# Patient Record
Sex: FEMALE | Employment: OTHER | URBAN - METROPOLITAN AREA
[De-identification: names, ages, dates, MRNs, and addresses within clinical notes are randomized per-mention and may not be internally consistent; named-entity substitution may affect disease eponyms.]

---

## 2018-01-04 ENCOUNTER — OFFICE VISIT (OUTPATIENT)
Dept: OBGYN | Facility: CLINIC | Age: 68
End: 2018-01-04
Payer: COMMERCIAL

## 2018-01-04 ENCOUNTER — RADIANT APPOINTMENT (OUTPATIENT)
Dept: MAMMOGRAPHY | Facility: CLINIC | Age: 68
End: 2018-01-04
Payer: COMMERCIAL

## 2018-01-04 DIAGNOSIS — Z87.410 HISTORY OF CERVICAL DYSPLASIA: ICD-10-CM

## 2018-01-04 DIAGNOSIS — F33.41 RECURRENT MAJOR DEPRESSIVE DISORDER, IN PARTIAL REMISSION (H): ICD-10-CM

## 2018-01-04 DIAGNOSIS — Z01.419 ENCOUNTER FOR GYNECOLOGICAL EXAMINATION WITHOUT ABNORMAL FINDING: Primary | ICD-10-CM

## 2018-01-04 DIAGNOSIS — Z12.31 VISIT FOR SCREENING MAMMOGRAM: ICD-10-CM

## 2018-01-04 PROCEDURE — 99213 OFFICE O/P EST LOW 20 MIN: CPT | Performed by: OBSTETRICS & GYNECOLOGY

## 2018-01-04 PROCEDURE — G0145 SCR C/V CYTO,THINLAYER,RESCR: HCPCS | Performed by: OBSTETRICS & GYNECOLOGY

## 2018-01-04 PROCEDURE — 77067 SCR MAMMO BI INCL CAD: CPT | Mod: TC

## 2018-01-04 PROCEDURE — G0476 HPV COMBO ASSAY CA SCREEN: HCPCS | Performed by: OBSTETRICS & GYNECOLOGY

## 2018-01-04 RX ORDER — SODIUM PHOSPHATE,MONO-DIBASIC 19G-7G/118
1 ENEMA (ML) RECTAL 2 TIMES DAILY
COMMUNITY

## 2018-01-04 ASSESSMENT — ANXIETY QUESTIONNAIRES
2. NOT BEING ABLE TO STOP OR CONTROL WORRYING: NOT AT ALL
IF YOU CHECKED OFF ANY PROBLEMS ON THIS QUESTIONNAIRE, HOW DIFFICULT HAVE THESE PROBLEMS MADE IT FOR YOU TO DO YOUR WORK, TAKE CARE OF THINGS AT HOME, OR GET ALONG WITH OTHER PEOPLE: NOT DIFFICULT AT ALL
6. BECOMING EASILY ANNOYED OR IRRITABLE: NOT AT ALL
1. FEELING NERVOUS, ANXIOUS, OR ON EDGE: SEVERAL DAYS
7. FEELING AFRAID AS IF SOMETHING AWFUL MIGHT HAPPEN: NOT AT ALL
5. BEING SO RESTLESS THAT IT IS HARD TO SIT STILL: NOT AT ALL
GAD7 TOTAL SCORE: 1
3. WORRYING TOO MUCH ABOUT DIFFERENT THINGS: NOT AT ALL

## 2018-01-04 ASSESSMENT — PATIENT HEALTH QUESTIONNAIRE - PHQ9
5. POOR APPETITE OR OVEREATING: NOT AT ALL
SUM OF ALL RESPONSES TO PHQ QUESTIONS 1-9: 11

## 2018-01-04 NOTE — LETTER
January 11, 2018    Izabella Acevedo  [address] [facility] [address]    Dear Izabella,  We are happy to inform you that your PAP smear result from 1/4/18 is normal.  We are now able to do a follow up test on PAP smears. The DNA test is for HPV (Human Papilloma Virus). Cervical cancer is closely linked with certain types of HPV. Your result showed no evidence of high risk HPV.  Therefore we recommend you return in 3 years for your next pap smear.  You will still need to return to the clinic every year for an annual exam and other preventive tests.  Please contact the clinic at [phone] with any questions.  Sincerely,    Orlando Escobar MD/alba

## 2018-01-04 NOTE — PROGRESS NOTES
Izabella is a 67 year old  female who presents for annual exam.     Besides routine health maintenance, she has no other health concerns today .    Do you have a Health Care Directive?: No: Advance care planning was reviewed with patient; patient declined at this time.    Pt declined weight and height for this appointment      HPI:  The patient's PCP is Dr. Yunier Faye.  Sees Psychiatry. Changed from Prozac to zoloft.  Hx of ANNA III in . S/P TV[facility] due to it.    GYNECOLOGIC HISTORY:No LMP recorded. Patient is postmenopausal..   reports that she has quit smoking. She does not have any smokeless tobacco history on file.      Patient is not sexually active.  STD testing offered?  Declined  Last PHQ-9 score on record= PHQ-9 SCORE 2018   Total Score 11     Last GAD7 score on record=   CROW-7 SCORE 10/1/2015 10/12/2016 2018   Total Score 2 0 1     Alcohol Score = 1    HEALTH MAINTENANCE:  Cholesterol: (No results found for: CHOL   Last Mammo: one year ago, Result: normal, Next Mammo: today   Pap: NA, hyst  DEXA:  >5 years ago  Colonoscopy:  , Result:  normal, Next Colonoscopy: this year.    Health maintenance updated:  yes    HISTORY:  Obstetric History       T0      L0     SAB1   TAB0   Ectopic0   Multiple0   Live Births0       # Outcome Date GA Lbr Dimitri/2nd Weight Sex Delivery Anes PTL Lv   1 SAB                 Patient Active Problem List   Diagnosis     Back pain with radiation     Hyperlipidemia     HTN (hypertension)     History of cervical dysplasia     Past Surgical History:   Procedure Laterality Date     HYSTERECTOMY TOTAL ABDOMINAL, BILATERAL SALPINGO-OOPHORECTOMY, COMBINED      ANNA III     TUBAL LIGATION        Social History   Substance Use Topics     Smoking status: Former Smoker     Smokeless tobacco: Not on file     Alcohol use 0.0 oz/week     0 Standard drinks or equivalent per week      Problem (# of Occurrences) Relation (Name,Age of Onset)    Colon Cancer  (1) Unspecified    Coronary Artery Disease (2) Father, Mother    DIABETES (1) Father    Hypertension (2) Brother, Sister    OSTEOPOROSIS (1) Sister    Thyroid Disease (1) Mother            Current Outpatient Prescriptions   Medication Sig     Sertraline HCl (ZOLOFT PO) Take 100 mg by mouth daily     glucosamine-chondroitin 500-400 MG CAPS per capsule Take 1 capsule by mouth daily     Omega-3 Fatty Acids (OMEGA-3 FISH OIL PO)      Cholecalciferol (VITAMIN D PO)      amphetamine-dextroamphetamine (ADDERALL) 20 MG tablet Take 20 mg by mouth     cholecalciferol (VITAMIN D3) 1000 UNIT tablet Take 1,000 Units by mouth     ibuprofen (ADVIL,MOTRIN) 200 MG tablet Take 200 mg by mouth     simvastatin (ZOCOR) 40 MG tablet Take 40 mg by mouth     buPROPion (WELLBUTRIN XL) 300 MG 24 hr tablet      aspirin 81 MG tablet Take 81 mg by mouth daily     Nutritional Supplements (BIFIDO-GENIC GROWTH FACTORS PO)      Coenzyme Q10 (CO Q 10 PO)      FLUoxetine (PROZAC) 20 MG capsule Take 3 tabs daily     No current facility-administered medications for this visit.        No Known Allergies    Past medical, surgical, social and family history were reviewed and updated in EPIC.    ROS:   Constitutional: Fatigue and Weight Gain  Skin: New Skin Lesions and Skin Dryness  Musculoskeletal: Joint Pain  Psychiatric: Depression    EXAM:  There were no vitals taken for this visit.   BMI: There is no height or weight on file to calculate BMI.    EXAM:  Constitutional: Appearance: Well nourished, well developed alert, in no acute distress  Neck:  Lymph Nodes:  No lymphadenopathy present    Thyroid:  Gland size normal, nontender, no nodules or masses present  on palpation  Chest:  Respiratory Effort:  Breathing unlabored  Cardiovascular:Heart    Auscultation:  Regular rate, normal rhythm, no murmurs present  Breasts: Inspection of Breasts:  No lymphadenopathy present., Palpation of Breasts and Axillae:  No masses present on palpation, no breast  tenderness., Axillary Lymph Nodes:  No lymphadenopathy present. and No nodularity, asymmetry or nipple discharge bilaterally.  Gastrointestinal:  Abdominal Examination:  Abdomen nontender to palpation, tone normal without     rigidity or guarding, no masses present, umbilicus without lesions    Liver and speen:  No hepatomegaly present, liver nontender to palpation    Hernias:  No hernias present  Lymphatic: Lymph Nodes:  No other lymphadenopathy present  Skin:  General Inspection:  No rashes present, no lesions present, no areas of  discoloration.    Genitalia and Groin:  No rashes present, no lesions present, no areas of  discoloration, no masses present  Neurologic/Psychiatric:    Mental Status:  Oriented X3     Pelvic Exam:  External Genitalia:     Normal appearance for age, no discharge present, no tenderness present, no inflammatory lesions present, color normal  Vagina:     Normal vaginal vault without central or paravaginal defects, no discharge present, no inflammatory lesions present, no masses present  Bladder:     Nontender to palpation  Urethra:   Urethral Body:  Urethra palpation normal, urethra structural support normal   Urethral Meatus:  No erythema or lesions present  Cervix:     Surgically absent  Uterus:     Surgically absent  Adnexa:     Surgically absent  Perineum:     Perineum within normal limits, no evidence of trauma, no rashes or skin lesions present  Anus:     Anus within normal limits, no hemorrhoids present  Inguinal Lymph Nodes:     No lymphadenopathy present      COUNSELING:   Reviewed preventive health counseling, as reflected in patient instructions       Regular exercise    BMI:  There is no height or weight on file to calculate BMI.  Weight management plan: Patient was referred to their PCP to discuss a diet and exercise plan.   reports that she has quit smoking. She does not have any smokeless tobacco history on file.        ASSESSMENT:  67 year old female with satisfactory  annual exam.No diagnosis found.    PLAN:  Check pap of vagina. Not considered low risk until 2020  Has f/u with Psych for depression    Orlando Escobar MD

## 2018-01-04 NOTE — MR AVS SNAPSHOT
"              After Visit Summary   2018    Izabella Acevedo    MRN: [id number]           Patient Information     Date Of Birth          1950        Visit Information        Provider Department      2018 2:00 PM Orlando Escobar MD [facility]        Today's Diagnoses     Encounter for gynecological examination without abnormal finding [Z01.419]    -  1    History of cervical dysplasia        Recurrent major depressive disorder, in partial remission (H)           Follow-ups after your visit        Who to contact     If you have questions or need follow up information about today's clinic visit or your schedule please contact [facility] directly at [phone].  Normal or non-critical lab and imaging results will be communicated to you by MyChart, letter or phone within 4 business days after the clinic has received the results. If you do not hear from us within 7 days, please contact the clinic through MyChart or phone. If you have a critical or abnormal lab result, we will notify you by phone as soon as possible.  Submit refill requests through [employer] or call your pharmacy and they will forward the refill request to us. Please allow 3 business days for your refill to be completed.          Additional Information About Your Visit        MyChart Information     [employer] lets you send messages to your doctor, view your test results, renew your prescriptions, schedule appointments and more. To sign up, go to [email or web address] . Click on \"Log in\" on the left side of the screen, which will take you to the Welcome page. Then click on \"Sign up Now\" on the right side of the page.     You will be asked to enter the access code listed below, as well as some personal information. Please follow the directions to create your username and password.     Your access code is: [email or web address]  Expires: 2018  2:37 PM     Your access code will  in 90 days. If you " need help or a new code, please call your [place] clinic or [phone].        Care EveryWhere ID     This is your Care EveryWhere ID. This could be used by other organizations to access your [place] medical records  [id number]         Blood Pressure from Last 3 Encounters:   10/12/16 122/78   02/12/16 122/76   10/01/15 132/72    Weight from Last 3 Encounters:   10/12/16 172 lb (78 kg)   02/12/16 165 lb (74.8 kg)   10/01/15 172 lb (78 kg)              We Performed the Following     HPV High Risk Types DNA Cervical     Pap imaged thin layer screen with HPV - recommended age 30 - 65        Primary Care Provider Office Phone # Fax #    Justyn Chavez [phone]       [facility] [address] [facility] [address]        Equal Access to Services     GINA CHAPA : Hadii sb villegas hadasho Somarielena, waaxda luqadaha, qaybta kaalmada adenathenyada, jasmyne gomez . So [facility] [phone].    ATENCIÓN: Si habla español, tiene a greenberg disposición servicios gratuitos de asistencia lingüística. Llame al [phone].    We comply with applicable federal civil rights laws and [place] laws. We do not discriminate on the basis of race, color, national origin, age, disability, sex, sexual orientation, or gender identity.            Thank you!     Thank you for choosing [facility]  for your care. Our goal is always to provide you with excellent care. Hearing back from our patients is one way we can continue to improve our services. Please take a few minutes to complete the written survey that you may receive in the mail after your visit with us. Thank you!             Your Updated Medication List - Protect others around you: Learn how to safely use, store and throw away your medicines at www.disposemymeds.org.          This list is accurate as of: 1/4/18  2:37 PM.  Always use your most recent med list.                   Brand Name Dispense  Instructions for use Diagnosis    ADDERALL 20 MG per tablet   Generic drug:  amphetamine-dextroamphetamine      Take 20 mg by mouth        aspirin 81 MG tablet      Take 81 mg by mouth daily        BIFIDO-GENIC GROWTH FACTORS PO           buPROPion 300 MG 24 hr tablet    WELLBUTRIN XL          * VITAMIN D PO           * cholecalciferol 1000 UNIT tablet    vitamin D3     Take 1,000 Units by mouth        CO Q 10 PO           glucosamine-chondroitin 500-400 MG Caps per capsule      Take 1 capsule by mouth daily        ibuprofen 200 MG tablet    ADVIL/MOTRIN     Take 200 mg by mouth        OMEGA-3 FISH OIL PO           PROZAC 20 MG capsule   Generic drug:  FLUoxetine      Take 3 tabs daily        simvastatin 40 MG tablet    ZOCOR     Take 40 mg by mouth        ZOLOFT PO      Take 100 mg by mouth daily        * Notice:  This list has 2 medication(s) that are the same as other medications prescribed for you. Read the directions carefully, and ask your doctor or other care provider to review them with you.

## 2018-01-05 ASSESSMENT — ANXIETY QUESTIONNAIRES: GAD7 TOTAL SCORE: 1

## 2018-01-08 LAB
COPATH REPORT: NORMAL
PAP: NORMAL

## 2018-01-10 LAB
FINAL DIAGNOSIS: NORMAL
HPV HR 12 DNA CVX QL NAA+PROBE: NEGATIVE
HPV16 DNA SPEC QL NAA+PROBE: NEGATIVE
HPV18 DNA SPEC QL NAA+PROBE: NEGATIVE
SPECIMEN DESCRIPTION: NORMAL

## 2019-03-21 ENCOUNTER — ANCILLARY PROCEDURE (OUTPATIENT)
Dept: MAMMOGRAPHY | Facility: CLINIC | Age: 69
End: 2019-03-21
Payer: COMMERCIAL

## 2019-03-21 ENCOUNTER — OFFICE VISIT (OUTPATIENT)
Dept: OBGYN | Facility: CLINIC | Age: 69
End: 2019-03-21
Payer: COMMERCIAL

## 2019-03-21 VITALS
WEIGHT: 174 LBS | BODY MASS INDEX: 32.02 KG/M2 | HEIGHT: 62 IN | SYSTOLIC BLOOD PRESSURE: 140 MMHG | DIASTOLIC BLOOD PRESSURE: 72 MMHG

## 2019-03-21 DIAGNOSIS — Z13.820 SCREENING FOR OSTEOPOROSIS: ICD-10-CM

## 2019-03-21 DIAGNOSIS — Z12.31 VISIT FOR SCREENING MAMMOGRAM: ICD-10-CM

## 2019-03-21 DIAGNOSIS — Z01.419 ENCOUNTER FOR GYNECOLOGICAL EXAMINATION WITHOUT ABNORMAL FINDING: Primary | ICD-10-CM

## 2019-03-21 DIAGNOSIS — Z87.410 HISTORY OF CERVICAL DYSPLASIA: ICD-10-CM

## 2019-03-21 PROCEDURE — 77067 SCR MAMMO BI INCL CAD: CPT | Mod: TC

## 2019-03-21 PROCEDURE — 99397 PER PM REEVAL EST PAT 65+ YR: CPT | Performed by: OBSTETRICS & GYNECOLOGY

## 2019-03-21 RX ORDER — DULOXETIN HYDROCHLORIDE 60 MG/1
60 CAPSULE, DELAYED RELEASE ORAL EVERY MORNING
COMMUNITY
Start: 2018-10-18

## 2019-03-21 ASSESSMENT — ANXIETY QUESTIONNAIRES
3. WORRYING TOO MUCH ABOUT DIFFERENT THINGS: NOT AT ALL
2. NOT BEING ABLE TO STOP OR CONTROL WORRYING: NOT AT ALL
GAD7 TOTAL SCORE: 0
5. BEING SO RESTLESS THAT IT IS HARD TO SIT STILL: NOT AT ALL
7. FEELING AFRAID AS IF SOMETHING AWFUL MIGHT HAPPEN: NOT AT ALL
6. BECOMING EASILY ANNOYED OR IRRITABLE: NOT AT ALL
IF YOU CHECKED OFF ANY PROBLEMS ON THIS QUESTIONNAIRE, HOW DIFFICULT HAVE THESE PROBLEMS MADE IT FOR YOU TO DO YOUR WORK, TAKE CARE OF THINGS AT HOME, OR GET ALONG WITH OTHER PEOPLE: NOT DIFFICULT AT ALL
1. FEELING NERVOUS, ANXIOUS, OR ON EDGE: NOT AT ALL

## 2019-03-21 ASSESSMENT — PATIENT HEALTH QUESTIONNAIRE - PHQ9
5. POOR APPETITE OR OVEREATING: NOT AT ALL
SUM OF ALL RESPONSES TO PHQ QUESTIONS 1-9: 10

## 2019-03-21 ASSESSMENT — MIFFLIN-ST. JEOR: SCORE: 1267.51

## 2019-03-21 NOTE — PROGRESS NOTES
"  Izabella is a 68 year old  female who presents for Medicare Limited exam.     Do you have a Health Care Directive?: {HEALTHCARE DIRECTIVE STATUS:821808}    Fall risk:   {Fall Risk for Medicare Annual Wellness Visit:823450::\"Fall Risk Assessment completed per order.\"}    HPI :  ***    GYNECOLOGIC HISTORY:  No LMP recorded. Patient is postmenopausal..   reports that she has quit smoking. she has never used smokeless tobacco.  {Tobacco Cessation -- Delete if patient is a non-smoker:919087}  STD testing offered?  {GC/CHLAMYDIA:911781}  Last PHQ-9 score on record=   PHQ-9 SCORE 2018   PHQ-9 Total Score 11     Last GAD7 score on record=   CROW-7 SCORE 10/1/2015 10/12/2016 2018   Total Score 2 0 1       HEALTH MAINTENANCE:  Cholesterol: (No results found for: CHOL   Last Mammo: {plc mammo:095708::\"one year ago\"}, Result: {plc normal:765012::\"normal\"}, Next Mammo: {plc next mammo:509710::\"today\"}   Pap: (  Lab Results   Component Value Date    PAP NIL 2018    )  DEXA:  ***  Colonoscopy:  ***, Result:  {plc normal:870437::\"normal\"}, Next Colonoscopy: *** years.    HISTORY:  Obstetric History       T0      L0     SAB1   TAB0   Ectopic0   Multiple0   Live Births0       # Outcome Date GA Lbr Dimitri/2nd Weight Sex Delivery Anes PTL Lv   1 SAB                 Past Medical History:   Diagnosis Date     Anxiety      ANNA III (cervical intraepithelial neoplasia grade III) with severe dysplasia      Depression     Managed by Psych     History of hormone replacement therapy      HTN (hypertension)      Hyperlipidemia     Managed by PMD     Past Surgical History:   Procedure Laterality Date     AS EXCISION,LALA TUMOR,MANDIBLE,EXTRA-ORAL      benign     HYSTERECTOMY TOTAL ABDOMINAL, BILATERAL SALPINGO-OOPHORECTOMY, COMBINED      ANNA III     TUBAL LIGATION       Family History   Problem Relation Age of Onset     Colon Cancer Other      Diabetes Father      Coronary Artery Disease Father      " Hypertension Brother      Hypertension Sister      Coronary Artery Disease Mother      Thyroid Disease Mother      Osteoporosis Sister      Social History     Socioeconomic History     Marital status:      Spouse name: Not on file     Number of children: Not on file     Years of education: Not on file     Highest education level: Not on file   Occupational History     Employer: RETIRED   Social Needs     Financial resource strain: Not on file     Food insecurity:     Worry: Not on file     Inability: Not on file     Transportation needs:     Medical: Not on file     Non-medical: Not on file   Tobacco Use     Smoking status: Former Smoker     Smokeless tobacco: Never Used   Substance and Sexual Activity     Alcohol use: Yes     Alcohol/week: 0.0 oz     Drug use: No     Sexual activity: No     Birth control/protection: Post-menopausal     Comment: postmenopausal   Lifestyle     Physical activity:     Days per week: Not on file     Minutes per session: Not on file     Stress: Not on file   Relationships     Social connections:     Talks on phone: Not on file     Gets together: Not on file     Attends [employer] service: Not on file     Active member of club or organization: Not on file     Attends meetings of clubs or organizations: Not on file     Relationship status: Not on file     Intimate partner violence:     Fear of current or ex partner: Not on file     Emotionally abused: Not on file     Physically abused: Not on file     Forced sexual activity: Not on file   Other Topics Concern     Parent/sibling w/ CABG, MI or angioplasty before 65F 55M? Not Asked   Social History Narrative     Not on file     Current Outpatient Medications   Medication Sig     amphetamine-dextroamphetamine (ADDERALL) 20 MG tablet Take 20 mg by mouth     aspirin 81 MG tablet Take 81 mg by mouth daily     buPROPion (WELLBUTRIN XL) 300 MG 24 hr tablet      Cholecalciferol (VITAMIN D PO)      cholecalciferol (VITAMIN D3) 1000 UNIT  "tablet Take 1,000 Units by mouth     Coenzyme Q10 (CO Q 10 PO)      FLUoxetine (PROZAC) 20 MG capsule Take 3 tabs daily     glucosamine-chondroitin 500-400 MG CAPS per capsule Take 1 capsule by mouth daily     ibuprofen (ADVIL,MOTRIN) 200 MG tablet Take 200 mg by mouth     Nutritional Supplements (BIFIDO-GENIC GROWTH FACTORS PO)      Omega-3 Fatty Acids (OMEGA-3 FISH OIL PO)      Sertraline HCl (ZOLOFT PO) Take 100 mg by mouth daily     simvastatin (ZOCOR) 40 MG tablet Take 40 mg by mouth     No current facility-administered medications for this visit.      No Known Allergies    Past medical, surgical, social and family history were reviewed and updated in EPIC.    EXAM:  There were no vitals taken for this visit.   BMI: There is no height or weight on file to calculate BMI.    Constitutional: Appearance: Well nourished, well developed alert, in no acute distress  Breasts: Inspection of Breasts:  No lymphadenopathy present    Palpation of Breasts and Axillae:  No masses present on palpation, no  breast tenderness    Axillary Lymph Nodes:  No lymphadenopathy present  Neurologic/Psychiatric:    Mental Status:  Oriented X3     {Pelvic Exam:731891}    There is no height or weight on file to calculate BMI.  {Weight Management Plan -- Delete if patient has a normal BMI:700347}   reports that she has quit smoking. she has never used smokeless tobacco.  {Tobacco Cessation -- Delete if patient is a non-smoker:656552}    ASSESSMENT:  68 year old female with satisfactory annual exam.  No diagnosis found.    COUNSELING:   {Female:387648::\"Reviewed preventive health counseling, as reflected in patient instructions\"}    PLAN/PATIENT INSTRUCTIONS:    There are no Patient Instructions on file for this visit.    Orlando Escobar MD          "

## 2019-03-21 NOTE — PROGRESS NOTES
Izabella is a 68 year old  female who presents for annual exam.     Besides routine health maintenance, has been dealing itching above vagina.    Do you have a Health Care Directive?: No: Advance care planning reviewed with patient; information given to patient to review.    Fall risk:   Fall Risk Assessment completed per order.    HPI:    Having some external vulvar itching. More at night.   Started new antiseptic.  Hx ANNA III in   Not planning to repeat colonoscopy.   The patient's PCP is Clement [place]. Lookin gfor one closer to home      GYNECOLOGIC HISTORY:  No LMP recorded. Patient is postmenopausal..   reports that she has quit smoking. she has never used smokeless tobacco.    Last PHQ-9 score on record=   PHQ-9 SCORE 3/21/2019   PHQ-9 Total Score 10     Last GAD7 score on record=   CROW-7 SCORE 10/12/2016 2018 3/21/2019   Total Score 0 1 0     Alcohol Score = 1    HEALTH MAINTENANCE:  Cholesterol: followed by primary care provider  Last Mammo: 18, Result: normal, Next Mammo: today   Pap:   Lab Results   Component Value Date    PAP NIL, Neg-HPV 2018   DEXA:  Unsure, needs to schedule  Colonoscopy:  , Result:  normal, Next Colonoscopy: will do FIT test if necessary  Health maintenance updated:  yes    HISTORY:  Obstetric History       T0      L0     SAB1   TAB0   Ectopic0   Multiple0   Live Births0       # Outcome Date GA Lbr Dimitri/2nd Weight Sex Delivery Anes PTL Lv   1 SAB                 Patient Active Problem List   Diagnosis     Back pain with radiation     Hyperlipidemia     HTN (hypertension)     History of carcinoma in situ of cervix     Past Surgical History:   Procedure Laterality Date     AS EXCISION,LALA TUMOR,MANDIBLE,EXTRA-ORAL      benign     HYSTERECTOMY TOTAL ABDOMINAL, BILATERAL SALPINGO-OOPHORECTOMY, COMBINED      ANNA III     TUBAL LIGATION        Social History     Tobacco Use     Smoking status: Former Smoker     Smokeless  "tobacco: Never Used   Substance Use Topics     Alcohol use: Yes     Alcohol/week: 0.0 oz      Problem (# of Occurrences) Relation (Name,Age of Onset)    Colon Cancer (1) Other    Coronary Artery Disease (2) Father, Mother    Diabetes (1) Father    Hypertension (2) Brother, Sister    Osteoporosis (1) Sister    Thyroid Disease (1) Mother            Current Outpatient Medications   Medication Sig     amphetamine-dextroamphetamine (ADDERALL) 20 MG tablet Take 20 mg by mouth     aspirin 81 MG tablet Take 81 mg by mouth daily     buPROPion (WELLBUTRIN XL) 300 MG 24 hr tablet      CALCIUM PO      Coenzyme Q10 (CO Q 10 PO)      DULoxetine (CYMBALTA) 60 MG capsule Take 90mg daily     glucosamine-chondroitin 500-400 MG CAPS per capsule Take 1 capsule by mouth daily     ibuprofen (ADVIL,MOTRIN) 200 MG tablet Take 200 mg by mouth     Multiple Minerals-Vitamins (BONE DENSITY BUILDER PO)      Nutritional Supplements (BIFIDO-GENIC GROWTH FACTORS PO)      Omega-3 Fatty Acids (OMEGA-3 FISH OIL PO)      simvastatin (ZOCOR) 40 MG tablet Take 40 mg by mouth     cholecalciferol (VITAMIN D3) 1000 UNIT tablet Take 2,000 Units by mouth      No current facility-administered medications for this visit.        No Known Allergies    Past medical, surgical, social and family history were reviewed and updated in EPIC.    ROS:   12 point review of systems negative other than symptoms noted below.  Constitutional: Fatigue  Eyes: Vision Loss  Respiratory: Shortness of Breath  Genitourinary: Vaginal Itching  Musculoskeletal: Joint Pain  Psychiatric: Depression    EXAM:  /72   Ht 1.575 m (5' 2\")   Wt 78.9 kg (174 lb)   BMI 31.83 kg/m     BMI: Body mass index is 31.83 kg/m .    EXAM:  Constitutional: Appearance: Well nourished, well developed alert, in no acute distress  Neck:  Lymph Nodes:  No lymphadenopathy present    Thyroid:  Gland size normal, nontender, no nodules or masses present  on palpation  Chest:  Respiratory Effort:  Breathing " unlabored  Cardiovascular:Heart    Auscultation:  Regular rate, normal rhythm, no murmurs present  Breasts: Inspection of Breasts:  No lymphadenopathy present., Palpation of Breasts and Axillae:  No masses present on palpation, no breast tenderness., Axillary Lymph Nodes:  No lymphadenopathy present. and No nodularity, asymmetry or nipple discharge bilaterally.  Gastrointestinal:  Abdominal Examination:  Abdomen nontender to palpation, tone normal without     rigidity or guarding, no masses present, umbilicus without lesions    Liver and speen:  No hepatomegaly present, liver nontender to palpation    Hernias:  No hernias present  Lymphatic: Lymph Nodes:  No other lymphadenopathy present  Skin:  General Inspection:  No rashes present, no lesions present, no areas of  discoloration.    Genitalia and Groin:  No rashes present, no lesions present, no areas of  discoloration, no masses present  Neurologic/Psychiatric:    Mental Status:  Oriented X3     Pelvic Exam:  External Genitalia:     Normal appearance for age, no discharge present, no tenderness present, no inflammatory lesions present, color normal  Vagina:     Normal vaginal vault without central or paravaginal defects, no discharge present, no inflammatory lesions present, no masses present  Bladder:     Nontender to palpation  Urethra:   Urethral Body:  Urethra palpation normal, urethra structural support normal   Urethral Meatus:  No erythema or lesions present  Cervix:     Surgically absent  Uterus:     Surgically absent  Adnexa:     Surgically absent  Perineum:     Perineum within normal limits, no evidence of trauma, no rashes or skin lesions present  Anus:     Anus within normal limits, no hemorrhoids present  Inguinal Lymph Nodes:     No lymphadenopathy present    COUNSELING:   Reviewed preventive health counseling, as reflected in patient instructions       Regular exercise    BMI:  Body mass index is 31.83 kg/m .  Weight management plan: Patient was  referred to their PCP to discuss a diet and exercise plan.   reports that she has quit smoking. she has never used smokeless tobacco.      ASSESSMENT:  68 year old female with satisfactory annual exam.    ICD-10-CM    1. Encounter for gynecological examination without abnormal finding Z01.419        PLAN:  Continue pap next year and 2022. Then stop them  RTC for DEXA.  Pt to inquire about cologard coverage.  Gave cards for new MDs    Orlando Escobar MD

## 2019-03-22 ASSESSMENT — ANXIETY QUESTIONNAIRES: GAD7 TOTAL SCORE: 0

## 2020-09-30 ENCOUNTER — HOSPITAL ENCOUNTER (OUTPATIENT)
Facility: CLINIC | Age: 70
End: 2020-09-30
Attending: ORTHOPAEDIC SURGERY | Admitting: ORTHOPAEDIC SURGERY
Payer: COMMERCIAL

## 2020-09-30 DIAGNOSIS — Z11.59 ENCOUNTER FOR SCREENING FOR OTHER VIRAL DISEASES: Primary | ICD-10-CM

## 2020-10-22 RX ORDER — CEFAZOLIN SODIUM 1 G/3ML
1 INJECTION, POWDER, FOR SOLUTION INTRAMUSCULAR; INTRAVENOUS SEE ADMIN INSTRUCTIONS
Status: CANCELLED | OUTPATIENT
Start: 2020-10-22

## 2020-10-22 RX ORDER — TRANEXAMIC ACID 650 MG/1
1950 TABLET ORAL ONCE
Status: CANCELLED | OUTPATIENT
Start: 2020-10-22 | End: 2020-10-22

## 2020-10-22 RX ORDER — CEFAZOLIN SODIUM 2 G/100ML
2 INJECTION, SOLUTION INTRAVENOUS
Status: CANCELLED | OUTPATIENT
Start: 2020-10-22

## 2020-11-13 DIAGNOSIS — Z11.59 ENCOUNTER FOR SCREENING FOR OTHER VIRAL DISEASES: ICD-10-CM

## 2020-11-13 PROCEDURE — U0003 INFECTIOUS AGENT DETECTION BY NUCLEIC ACID (DNA OR RNA); SEVERE ACUTE RESPIRATORY SYNDROME CORONAVIRUS 2 (SARS-COV-2) (CORONAVIRUS DISEASE [COVID-19]), AMPLIFIED PROBE TECHNIQUE, MAKING USE OF HIGH THROUGHPUT TECHNOLOGIES AS DESCRIBED BY CMS-2020-01-R: HCPCS | Performed by: ORTHOPAEDIC SURGERY

## 2020-11-14 LAB
SARS-COV-2 RNA SPEC QL NAA+PROBE: NOT DETECTED
SPECIMEN SOURCE: NORMAL

## 2020-11-16 ENCOUNTER — HOSPITAL ENCOUNTER (OUTPATIENT)
Facility: CLINIC | Age: 70
End: 2020-11-16
Attending: ORTHOPAEDIC SURGERY | Admitting: ORTHOPAEDIC SURGERY
Payer: COMMERCIAL

## 2020-11-16 RX ORDER — AMPICILLIN TRIHYDRATE 250 MG
200 CAPSULE ORAL EVERY EVENING
COMMUNITY
End: 2020-11-16 | Stop reason: HOSPADM

## 2020-11-16 RX ORDER — DULOXETIN HYDROCHLORIDE 30 MG/1
30 CAPSULE, DELAYED RELEASE ORAL EVERY MORNING
COMMUNITY
End: 2020-11-16 | Stop reason: HOSPADM

## 2020-11-16 RX ORDER — MULTIVIT-MIN/IRON/FOLIC ACID/K 18-600-40
500 CAPSULE ORAL EVERY MORNING
COMMUNITY
End: 2020-11-16 | Stop reason: HOSPADM

## 2020-11-16 RX ORDER — TURMERIC 400 MG
400 CAPSULE ORAL EVERY EVENING
COMMUNITY
End: 2020-11-16 | Stop reason: HOSPADM

## 2020-11-16 RX ORDER — CLOBETASOL PROPIONATE 0.5 MG/G
CREAM TOPICAL 2 TIMES DAILY PRN
COMMUNITY
End: 2020-11-16 | Stop reason: HOSPADM

## 2020-11-16 RX ORDER — DEXTROAMPHETAMINE SACCHARATE, AMPHETAMINE ASPARTATE, DEXTROAMPHETAMINE SULFATE AND AMPHETAMINE SULFATE 5; 5; 5; 5 MG/1; MG/1; MG/1; MG/1
30 TABLET ORAL EVERY MORNING
COMMUNITY
End: 2020-11-16 | Stop reason: HOSPADM

## 2020-11-16 NOTE — PROGRESS NOTES
PTA medications updated by Medication Scribe prior to surgery via phone call with patient      -LAST DOSES ENTERED BY NURSE-    Comments:    Medication history sources: Patient, Surescripts, H&P and Patient's home med list  Medication history source reliability: Good  Adherence assessment: N/A Not Observed    Significant changes made to the medication list:  Patient taking meds differently than prescribed; See PTA entries for: Adderall     Patient reports no longer taking the following meds (med scribe removed from PTA med list): Mult.-Vit.-Bone Density Builder      Additional medication history information:   None        Prior to Admission medications    Medication Sig Last Dose Taking? Auth Provider   amphetamine-dextroamphetamine (ADDERALL) 20 MG tablet Take 30 mg by mouth every morning (1.5 X 20 mg)  at AM Yes Reported, Patient   Ascorbic Acid (VITAMIN C) 500 MG CAPS Take 500 mg by mouth every morning  at AM Yes Reported, Patient   aspirin (ASA) 81 MG EC tablet Take 81 mg by mouth every evening   at PM Yes Reported, Patient   B Complex-C (VITAMIN B COMPLEX W/VITAMIN C) TABS tablet Take 1 tablet by mouth daily (NatureMade)  at AM Yes Reported, Patient   buPROPion (WELLBUTRIN XL) 300 MG 24 hr tablet Take 300 mg by mouth every morning   at AM Yes Reported, Patient   calcium-magnesium-vitamin D 500-250-125 MG-MG-UNIT TABS Take 1 tablet by mouth 2 times daily  at AM Yes Reported, Patient   Cholecalciferol (VITAMIN D3) 1.25 MG (18103 UT) TABS Take 5,000 Units by mouth every morning   at AM Yes Reported, Patient   clobetasol (TEMOVATE) 0.05 % external cream Apply topically 2 times daily as needed  at PRN Yes Reported, Patient   Coenzyme Q10 (COQ10) 200 MG CAPS Take 200 mg by mouth every evening  at PM Yes Reported, Patient   DULoxetine (CYMBALTA) 30 MG capsule Take 30 mg by mouth every morning (in addition to 60 mg morning dose to = 90 mg dose daily)  at AM Yes Reported, Patient   DULoxetine (CYMBALTA) 60 MG capsule  Take 60 mg by mouth every morning (in addition to 30 mg dose to = 90 mg dose daily)  at AM Yes Reported, Patient   fish oil-omega-3 fatty acids (OMEGA-3 FISH OIL) 1000 MG capsule Take 1 g by mouth every evening   at PM Yes Reported, Patient   glucosamine-chondroitin 500-400 MG CAPS per capsule Take 1 capsule by mouth 2 times daily   at AM Yes Reported, Patient   ibuprofen (ADVIL,MOTRIN) 200 MG tablet Take 400-800 mg by mouth every 8 hours as needed   at PRN Yes Reported, Patient   Probiotic Product (PROBIOTIC-10 PO) Take 2 capsules by mouth every morning (Bifido Balance by [employer])  at AM Yes Reported, Patient   Selenium 100 MCG CAPS Take 100 mcg by mouth every evening  at PM Yes Reported, Patient   simvastatin (ZOCOR) 40 MG tablet Take 40 mg by mouth every evening   at PM Yes Reported, Patient   Turmeric 400 MG CAPS Take 400 mg by mouth every evening  at PM Yes Reported, Patient   VITAMIN E COMPLEX PO Take 1 capsule by mouth every morning (Cloudy Days brand)  at AM Yes Reported, Patient

## 2020-11-21 DIAGNOSIS — Z11.59 ENCOUNTER FOR SCREENING FOR OTHER VIRAL DISEASES: Primary | ICD-10-CM

## 2020-12-21 ENCOUNTER — HOSPITAL ENCOUNTER (OUTPATIENT)
Facility: CLINIC | Age: 70
End: 2020-12-21
Attending: ORTHOPAEDIC SURGERY | Admitting: ORTHOPAEDIC SURGERY
Payer: COMMERCIAL

## 2021-01-15 ENCOUNTER — HEALTH MAINTENANCE LETTER (OUTPATIENT)
Age: 71
End: 2021-01-15

## 2021-03-26 ENCOUNTER — MEDICAL CORRESPONDENCE (OUTPATIENT)
Dept: HEALTH INFORMATION MANAGEMENT | Facility: CLINIC | Age: 71
End: 2021-03-26

## 2021-04-07 RX ORDER — CEFAZOLIN SODIUM 2 G/100ML
2 INJECTION, SOLUTION INTRAVENOUS
Status: CANCELLED | OUTPATIENT
Start: 2021-04-07

## 2021-04-07 RX ORDER — TRANEXAMIC ACID 650 MG/1
1950 TABLET ORAL ONCE
Status: CANCELLED | OUTPATIENT
Start: 2021-04-07 | End: 2021-04-07

## 2021-04-07 RX ORDER — CEFAZOLIN SODIUM 2 G/100ML
2 INJECTION, SOLUTION INTRAVENOUS SEE ADMIN INSTRUCTIONS
Status: CANCELLED | OUTPATIENT
Start: 2021-04-07

## 2021-04-20 ENCOUNTER — TRANSFERRED RECORDS (OUTPATIENT)
Dept: SURGERY | Facility: CLINIC | Age: 71
End: 2021-04-20

## 2021-10-24 ENCOUNTER — HEALTH MAINTENANCE LETTER (OUTPATIENT)
Age: 71
End: 2021-10-24

## 2021-12-23 ENCOUNTER — ANCILLARY PROCEDURE (OUTPATIENT)
Dept: MAMMOGRAPHY | Facility: CLINIC | Age: 71
End: 2021-12-23
Payer: COMMERCIAL

## 2021-12-23 DIAGNOSIS — Z12.31 VISIT FOR SCREENING MAMMOGRAM: ICD-10-CM

## 2021-12-23 PROCEDURE — 77067 SCR MAMMO BI INCL CAD: CPT | Mod: TC | Performed by: RADIOLOGY

## 2022-02-13 ENCOUNTER — HEALTH MAINTENANCE LETTER (OUTPATIENT)
Age: 72
End: 2022-02-13

## 2022-10-15 ENCOUNTER — HEALTH MAINTENANCE LETTER (OUTPATIENT)
Age: 72
End: 2022-10-15

## 2023-03-26 ENCOUNTER — HEALTH MAINTENANCE LETTER (OUTPATIENT)
Age: 73
End: 2023-03-26

## 2023-10-29 ENCOUNTER — HEALTH MAINTENANCE LETTER (OUTPATIENT)
Age: 73
End: 2023-10-29

## 2024-05-26 ENCOUNTER — HEALTH MAINTENANCE LETTER (OUTPATIENT)
Age: 74
End: 2024-05-26

## 2024-12-21 ENCOUNTER — HEALTH MAINTENANCE LETTER (OUTPATIENT)
Age: 74
End: 2024-12-21

## 2025-04-25 ENCOUNTER — HOSPITAL ENCOUNTER (EMERGENCY)
Facility: CLINIC | Age: 75
Discharge: HOME | End: 2025-04-25
Attending: EMERGENCY MEDICINE | Admitting: EMERGENCY MEDICINE
Payer: COMMERCIAL

## 2025-04-25 VITALS
OXYGEN SATURATION: 98 % | TEMPERATURE: 98.3 F | WEIGHT: 170 LBS | HEART RATE: 79 BPM | BODY MASS INDEX: 31.28 KG/M2 | SYSTOLIC BLOOD PRESSURE: 127 MMHG | RESPIRATION RATE: 16 BRPM | DIASTOLIC BLOOD PRESSURE: 80 MMHG | HEIGHT: 62 IN

## 2025-04-25 DIAGNOSIS — Z78.9 TAKES DIETARY SUPPLEMENTS: ICD-10-CM

## 2025-04-25 LAB
ALBUMIN SERPL BCG-MCNC: 4 G/DL (ref 3.5–5.2)
ALP SERPL-CCNC: 116 U/L (ref 40–150)
ALT SERPL W P-5'-P-CCNC: 20 U/L (ref 0–50)
ANION GAP SERPL CALCULATED.3IONS-SCNC: 11 MMOL/L (ref 7–15)
AST SERPL W P-5'-P-CCNC: 21 U/L (ref 0–45)
BASE EXCESS BLDV CALC-SCNC: 3.9 MMOL/L (ref -3–3)
BASOPHILS # BLD AUTO: 0.1 10E3/UL (ref 0–0.2)
BASOPHILS NFR BLD AUTO: 1 %
BILIRUB SERPL-MCNC: 0.8 MG/DL
BUN SERPL-MCNC: 16.3 MG/DL (ref 8–23)
CALCIUM SERPL-MCNC: 9.2 MG/DL (ref 8.8–10.4)
CHLORIDE SERPL-SCNC: 103 MMOL/L (ref 98–107)
CREAT SERPL-MCNC: 1.15 MG/DL (ref 0.51–0.95)
EGFRCR SERPLBLD CKD-EPI 2021: 49 ML/MIN/1.73M2
EOSINOPHIL # BLD AUTO: 0.2 10E3/UL (ref 0–0.7)
EOSINOPHIL NFR BLD AUTO: 2 %
ERYTHROCYTE [DISTWIDTH] IN BLOOD BY AUTOMATED COUNT: 13.3 % (ref 10–15)
GLUCOSE SERPL-MCNC: 80 MG/DL (ref 70–99)
HCO3 BLDV-SCNC: 29 MMOL/L (ref 21–28)
HCO3 SERPL-SCNC: 26 MMOL/L (ref 22–29)
HCT VFR BLD AUTO: 40.7 % (ref 35–47)
HGB BLD-MCNC: 13.8 G/DL (ref 11.7–15.7)
IMM GRANULOCYTES # BLD: 0 10E3/UL
IMM GRANULOCYTES NFR BLD: 0 %
LYMPHOCYTES # BLD AUTO: 2.2 10E3/UL (ref 0.8–5.3)
LYMPHOCYTES NFR BLD AUTO: 23 %
MCH RBC QN AUTO: 30.1 PG (ref 26.5–33)
MCHC RBC AUTO-ENTMCNC: 33.9 G/DL (ref 31.5–36.5)
MCV RBC AUTO: 89 FL (ref 78–100)
MONOCYTES # BLD AUTO: 0.5 10E3/UL (ref 0–1.3)
MONOCYTES NFR BLD AUTO: 5 %
NEUTROPHILS # BLD AUTO: 6.7 10E3/UL (ref 1.6–8.3)
NEUTROPHILS NFR BLD AUTO: 69 %
NRBC # BLD AUTO: 0 10E3/UL
NRBC BLD AUTO-RTO: 0 /100
O2/TOTAL GAS SETTING VFR VENT: 21 %
OXYHGB MFR BLDV: 46 % (ref 70–75)
PCO2 BLDV: 47 MM HG (ref 40–50)
PH BLDV: 7.41 [PH] (ref 7.32–7.43)
PLATELET # BLD AUTO: 301 10E3/UL (ref 150–450)
PO2 BLDV: 26 MM HG (ref 25–47)
POTASSIUM SERPL-SCNC: 4.2 MMOL/L (ref 3.4–5.3)
PROT SERPL-MCNC: 7 G/DL (ref 6.4–8.3)
RBC # BLD AUTO: 4.58 10E6/UL (ref 3.8–5.2)
SAO2 % BLDV: 46.9 % (ref 70–75)
SODIUM SERPL-SCNC: 140 MMOL/L (ref 135–145)
TROPONIN T SERPL HS-MCNC: 8 NG/L
WBC # BLD AUTO: 9.7 10E3/UL (ref 4–11)

## 2025-04-25 PROCEDURE — 80053 COMPREHEN METABOLIC PANEL: CPT | Performed by: EMERGENCY MEDICINE

## 2025-04-25 PROCEDURE — 99284 EMERGENCY DEPT VISIT MOD MDM: CPT | Performed by: EMERGENCY MEDICINE

## 2025-04-25 PROCEDURE — 93005 ELECTROCARDIOGRAM TRACING: CPT | Performed by: EMERGENCY MEDICINE

## 2025-04-25 PROCEDURE — 99283 EMERGENCY DEPT VISIT LOW MDM: CPT | Performed by: EMERGENCY MEDICINE

## 2025-04-25 PROCEDURE — 85025 COMPLETE CBC W/AUTO DIFF WBC: CPT | Performed by: EMERGENCY MEDICINE

## 2025-04-25 PROCEDURE — 36415 COLL VENOUS BLD VENIPUNCTURE: CPT | Performed by: EMERGENCY MEDICINE

## 2025-04-25 PROCEDURE — 84484 ASSAY OF TROPONIN QUANT: CPT | Performed by: EMERGENCY MEDICINE

## 2025-04-25 PROCEDURE — 93010 ELECTROCARDIOGRAM REPORT: CPT | Performed by: EMERGENCY MEDICINE

## 2025-04-25 PROCEDURE — 82805 BLOOD GASES W/O2 SATURATION: CPT | Performed by: EMERGENCY MEDICINE

## 2025-04-25 ASSESSMENT — ACTIVITIES OF DAILY LIVING (ADL)
ADLS_ACUITY_SCORE: 41

## 2025-04-25 ASSESSMENT — COLUMBIA-SUICIDE SEVERITY RATING SCALE - C-SSRS
1. IN THE PAST MONTH, HAVE YOU WISHED YOU WERE DEAD OR WISHED YOU COULD GO TO SLEEP AND NOT WAKE UP?: NO
6. HAVE YOU EVER DONE ANYTHING, STARTED TO DO ANYTHING, OR PREPARED TO DO ANYTHING TO END YOUR LIFE?: NO
2. HAVE YOU ACTUALLY HAD ANY THOUGHTS OF KILLING YOURSELF IN THE PAST MONTH?: NO

## 2025-04-25 NOTE — ED TRIAGE NOTES
Patient started taking Bitter Apricot Kernels to help prevent cancer which she bought online, patient wa sonly suppose to take 1/day but patient took 8-10 kernels.      Triage Assessment (Adult)       Row Name 04/25/25 7530          Triage Assessment    Airway WDL WDL        Respiratory WDL    Respiratory WDL WDL        Skin Circulation/Temperature WDL    Skin Circulation/Temperature WDL WDL        Cardiac WDL    Cardiac WDL WDL        Peripheral/Neurovascular WDL    Peripheral Neurovascular WDL WDL        Cognitive/Neuro/Behavioral WDL    Cognitive/Neuro/Behavioral WDL WDL

## 2025-04-25 NOTE — ED PROVIDER NOTES
ED Provider Note  [facility] EMERGENCY DEPARTMENT ([facility])    4/25/25       ED PROVIDER NOTE     History     Chief Complaint   Patient presents with    Dizziness     Patient started taking Bitter Apricot Kernels and thinks she took too much and its making her lightheaded and dizzy     HPI  Izabella Acevedo is a 74 year old female with a notable history of hyperlipidemia, hypertension, and cervical cancer who presents to the ED for evaluation of bitter apricot kernel ingestion.  The patient states that she heard a podcast that recommended bitter apricot kernel as a cancer preventative.  She states that she obtained a product from [employer]: Pure wid Cure bitter apricot kernels.  Patient states that she put 8-10 salad and ate a salad.  After she completed the salad, she looked at the package and only recommended 2 to 3 kernels /day.  She subsequently performed an Internet search and became concerned for a cyanide toxicity.  She presents to the emergency department now approxi-1 hour after ingesting the bitter apricot kernels.  She denies any symptoms.    Past Medical History  Past Medical History:   Diagnosis Date    ADD (attention deficit disorder)     Anxiety     ANNA III (cervical intraepithelial neoplasia grade III) with severe dysplasia     Depression     Managed by Psych    History of hormone replacement therapy     HTN (hypertension)     Hyperlipidemia     Managed by PMD    Sleep apnea      Past Surgical History:   Procedure Laterality Date    AS EXCISION,LALA TUMOR,MANDIBLE,EXTRA-ORAL      benign    EYE SURGERY      cataract    HYSTERECTOMY TOTAL ABDOMINAL, BILATERAL SALPINGO-OOPHORECTOMY, COMBINED  2002    ANNA III    TUBAL LIGATION  1984     aspirin (ASA) 81 MG EC tablet  buPROPion (WELLBUTRIN XL) 300 MG 24 hr tablet  Cholecalciferol (VITAMIN D3) 1.25 MG (68588 UT) TABS  DULoxetine (CYMBALTA) 60 MG capsule  fish oil-omega-3 fatty acids (OMEGA-3 FISH OIL) 1000 MG  "capsule  glucosamine-chondroitin 500-400 MG CAPS per capsule  ibuprofen (ADVIL,MOTRIN) 200 MG tablet  simvastatin (ZOCOR) 40 MG tablet      No Known Allergies  Family History  Family History   Problem Relation Age of Onset    Colon Cancer Other     Diabetes Father     Coronary Artery Disease Father     Hypertension Brother     Hypertension Sister     Coronary Artery Disease Mother     Thyroid Disease Mother     Osteoporosis Sister      Social History   Social History     Tobacco Use    Smoking status: Former     Current packs/day: 0.00     Types: Cigarettes     Quit date: 2020     Years since quittin.7    Smokeless tobacco: Never   Substance Use Topics    Alcohol use: Yes     Alcohol/week: 0.0 standard drinks of alcohol     Comment: OCASIONALY    Drug use: No      A medically appropriate review of systems was performed with pertinent positives and negatives noted in the HPI, and all other systems negative.    Physical Exam   BP: 131/76  Pulse: 93  Temp: 98.3  F (36.8  C)  Resp: 16  Height: 157.5 cm (5' 2\")  Weight: 77.1 kg (170 lb)  SpO2: 98 %  Physical Exam  Vitals and nursing note reviewed.   Constitutional:       General: She is not in acute distress.     Appearance: She is not diaphoretic.   HENT:      Head: Normocephalic and atraumatic.   Eyes:      Extraocular Movements: Extraocular movements intact.      Conjunctiva/sclera: Conjunctivae normal.   Cardiovascular:      Rate and Rhythm: Normal rate.      Heart sounds: Normal heart sounds.   Pulmonary:      Effort: Pulmonary effort is normal. No respiratory distress.      Breath sounds: Normal breath sounds.   Abdominal:      Palpations: Abdomen is soft.      Tenderness: There is no abdominal tenderness.   Musculoskeletal:         General: No tenderness. Normal range of motion.      Cervical back: Normal range of motion and neck supple.   Skin:     General: Skin is warm.      Findings: No rash.   Neurological:      General: No focal deficit present.      " Cranial Nerves: No cranial nerve deficit.      Sensory: No sensory deficit.      Motor: No weakness.      Coordination: Coordination normal.      Gait: Gait normal.      Deep Tendon Reflexes: Reflexes abnormal.   Psychiatric:         Mood and Affect: Mood normal.           ED Course, Procedures, & Data      Procedures            EKG Interpretation:      Interpreted by IDALIA CARRILLO MD, MD  Time reviewed: 1900  Symptoms at time of EKG: none, bitter apricot seed ingestion  Rhythm: normal sinus   Rate: normal  Axis: normal  Ectopy: none  Conduction: normal  ST Segments/ T Waves: Non-specific ST-T wave changes  Q Waves: none  Comparison to prior: New nonspecific ST and T wave changes laterally compared to 4/25/2025    Clinical Impression: Nonspecific EKG           Results for orders placed or performed during the hospital encounter of 04/25/25   Comprehensive metabolic panel     Status: Abnormal   Result Value Ref Range    Sodium 140 135 - 145 mmol/L    Potassium 4.2 3.4 - 5.3 mmol/L    Carbon Dioxide (CO2) 26 22 - 29 mmol/L    Anion Gap 11 7 - 15 mmol/L    Urea Nitrogen 16.3 8.0 - 23.0 mg/dL    Creatinine 1.15 (H) 0.51 - 0.95 mg/dL    GFR Estimate 49 (L) >60 mL/min/1.73m2    Calcium 9.2 8.8 - 10.4 mg/dL    Chloride 103 98 - 107 mmol/L    Glucose 80 70 - 99 mg/dL    Alkaline Phosphatase 116 40 - 150 U/L    AST 21 0 - 45 U/L    ALT 20 0 - 50 U/L    Protein Total 7.0 6.4 - 8.3 g/dL    Albumin 4.0 3.5 - 5.2 g/dL    Bilirubin Total 0.8 <=1.2 mg/dL   Troponin T, High Sensitivity     Status: Normal   Result Value Ref Range    Troponin T, High Sensitivity 8 <=14 ng/L   Blood gas venous     Status: Abnormal   Result Value Ref Range    pH Venous 7.41 7.32 - 7.43    pCO2 Venous 47 40 - 50 mm Hg    pO2 Venous 26 25 - 47 mm Hg    Bicarbonate Venous 29 (H) 21 - 28 mmol/L    Base Excess/Deficit Venous 3.9 (H) -3.0 - 3.0 mmol/L    FIO2 21     Oxyhemoglobin Venous 46 (L) 70 - 75 %    O2 Sat, Venous 46.9 (L) 70.0 - 75.0 %    Narrative     In healthy individuals, oxyhemoglobin (O2Hb) and oxygen saturation (SO2) are approximately equal. In the presence of dyshemoglobins, oxyhemoglobin can be considerably lower than oxygen saturation.   CBC with platelets and differential     Status: None   Result Value Ref Range    WBC Count 9.7 4.0 - 11.0 10e3/uL    RBC Count 4.58 3.80 - 5.20 10e6/uL    Hemoglobin 13.8 11.7 - 15.7 g/dL    Hematocrit 40.7 35.0 - 47.0 %    MCV 89 78 - 100 fL    MCH 30.1 26.5 - 33.0 pg    MCHC 33.9 31.5 - 36.5 g/dL    RDW 13.3 10.0 - 15.0 %    Platelet Count 301 150 - 450 10e3/uL    % Neutrophils 69 %    % Lymphocytes 23 %    % Monocytes 5 %    % Eosinophils 2 %    % Basophils 1 %    % Immature Granulocytes 0 %    NRBCs per 100 WBC 0 <1 /100    Absolute Neutrophils 6.7 1.6 - 8.3 10e3/uL    Absolute Lymphocytes 2.2 0.8 - 5.3 10e3/uL    Absolute Monocytes 0.5 0.0 - 1.3 10e3/uL    Absolute Eosinophils 0.2 0.0 - 0.7 10e3/uL    Absolute Basophils 0.1 0.0 - 0.2 10e3/uL    Absolute Immature Granulocytes 0.0 <=0.4 10e3/uL    Absolute NRBCs 0.0 10e3/uL   EKG 12-lead, tracing only     Status: None (Preliminary result)   Result Value Ref Range    Systolic Blood Pressure  mmHg    Diastolic Blood Pressure  mmHg    Ventricular Rate 95 BPM    Atrial Rate 95 BPM    RI Interval 142 ms    QRS Duration 82 ms     ms    QTc 449 ms    P Axis 73 degrees    R AXIS 31 degrees    T Axis 83 degrees    Interpretation ECG       Sinus rhythm  Nonspecific ST and T wave abnormality  Abnormal ECG     CBC with platelets differential     Status: None    Narrative    The following orders were created for panel order CBC with platelets differential.  Procedure                               Abnormality         Status                     ---------                               -----------         ------                     CBC with platelets and ...[[id number]]                      Final result                 Please view results for these tests on the individual  orders.     Medications - No data to display  Labs Ordered and Resulted from Time of ED Arrival to Time of ED Departure   COMPREHENSIVE METABOLIC PANEL - Abnormal       Result Value    Sodium 140      Potassium 4.2      Carbon Dioxide (CO2) 26      Anion Gap 11      Urea Nitrogen 16.3      Creatinine 1.15 (*)     GFR Estimate 49 (*)     Calcium 9.2      Chloride 103      Glucose 80      Alkaline Phosphatase 116      AST 21      ALT 20      Protein Total 7.0      Albumin 4.0      Bilirubin Total 0.8     BLOOD GAS VENOUS - Abnormal    pH Venous 7.41      pCO2 Venous 47      pO2 Venous 26      Bicarbonate Venous 29 (*)     Base Excess/Deficit Venous 3.9 (*)     FIO2 21      Oxyhemoglobin Venous 46 (*)     O2 Sat, Venous 46.9 (*)    TROPONIN T, HIGH SENSITIVITY - Normal    Troponin T, High Sensitivity 8     CBC WITH PLATELETS AND DIFFERENTIAL    WBC Count 9.7      RBC Count 4.58      Hemoglobin 13.8      Hematocrit 40.7      MCV 89      MCH 30.1      MCHC 33.9      RDW 13.3      Platelet Count 301      % Neutrophils 69      % Lymphocytes 23      % Monocytes 5      % Eosinophils 2      % Basophils 1      % Immature Granulocytes 0      NRBCs per 100 WBC 0      Absolute Neutrophils 6.7      Absolute Lymphocytes 2.2      Absolute Monocytes 0.5      Absolute Eosinophils 0.2      Absolute Basophils 0.1      Absolute Immature Granulocytes 0.0      Absolute NRBCs 0.0       No orders to display      6:50 PM discussed with poison control.  Did not suspect amount ingested would be a toxic dose.  Do not recommend any baseline labs.  Recommended monitoring for 2 to 3 hours and plan to call back at that time.    8:13 PM continues to be asymptomatic.  Reviewed test results with patient.    Observed for 3 hours and urgency department.  Patient denies any symptoms.  She appears clinically well.  She is requesting discharge.    Critical care was not performed.     Medical Decision Making  The patient's presentation was of moderate  complexity (an undiagnosed new problem with uncertain prognosis).    The patient's evaluation involved:  ordering and/or review of 3+ test(s) in this encounter (see separate area of note for details)  independent interpretation of testing performed by another health professional (EKG interpreted by me with above results)  discussion of management or test interpretation with another health professional (poison control)    The patient's management necessitated only low risk treatment.    Assessment & Plan    74 year old female to the emergency department for evaluation after she took excessive amount of bitter apricot kernels as a nutritional supplement.  She subsequently performed an Internet search and learned that bitter apricot kernels can result and cyanide toxicity in excessive amounts.  The patient arrives with an hour of supplement ingestion and without symptoms.  Discussed with poison control who felt that the amount ingested was low risk but recommended 2 to 3 hours of observation.  Laboratory testing was undertaken and is largely unremarkable.  Patient has mild renal insufficiency but no electrolyte disturbance.  Her EKG has nonspecific lateral T wave changes but her troponin is normal.  Additionally, the patient does not have any symptoms that would be consistent with ACS such as chest pain, dyspnea, lightheadedness, or presyncope.  The patient's venous blood gas has normal pH and no evidence for hyperoxia.  Patient was observed for 3 hours in the emergency department.  She continues to be asymptomatic and she is anxious for discharge.  Patient cautioned against using excessive amounts of supplements.  Primary care follow-up recommended.    I have reviewed the nursing notes. I have reviewed the findings, diagnosis, plan and need for follow up with the patient.    Discharge Medication List as of 4/25/2025  9:29 PM          Final diagnoses:   Takes dietary supplements     Chart documentation was completed  with Dragon voice-recognition software. Even though reviewed, this chart may still contain some grammatical, spelling, and word errors.     [facility] EMERGENCY DEPARTMENT  4/25/2025     Dimitri Hidalgo MD  04/26/25 6812

## 2025-04-26 LAB
ATRIAL RATE - MUSE: 95 BPM
DIASTOLIC BLOOD PRESSURE - MUSE: NORMAL MMHG
INTERPRETATION ECG - MUSE: NORMAL
P AXIS - MUSE: 73 DEGREES
PR INTERVAL - MUSE: 142 MS
QRS DURATION - MUSE: 82 MS
QT - MUSE: 358 MS
QTC - MUSE: 449 MS
R AXIS - MUSE: 31 DEGREES
SYSTOLIC BLOOD PRESSURE - MUSE: NORMAL MMHG
T AXIS - MUSE: 83 DEGREES
VENTRICULAR RATE- MUSE: 95 BPM

## 2025-04-26 NOTE — DISCHARGE INSTRUCTIONS
Use dietary supplement including bitter apricot kernels only as directed.    Return if you develop any symptoms or for other concerns.    Follow-up with your primary care clinic as needed.

## 2025-06-14 ENCOUNTER — HEALTH MAINTENANCE LETTER (OUTPATIENT)
Age: 75
End: 2025-06-14